# Patient Record
Sex: MALE | Race: BLACK OR AFRICAN AMERICAN | URBAN - METROPOLITAN AREA
[De-identification: names, ages, dates, MRNs, and addresses within clinical notes are randomized per-mention and may not be internally consistent; named-entity substitution may affect disease eponyms.]

---

## 2019-04-04 ENCOUNTER — OFFICE VISIT (OUTPATIENT)
Dept: FAMILY MEDICINE CLINIC | Age: 44
End: 2019-04-04

## 2019-04-04 VITALS — BODY MASS INDEX: 31.54 KG/M2 | HEIGHT: 73 IN | WEIGHT: 238 LBS | RESPIRATION RATE: 17 BRPM

## 2019-04-04 DIAGNOSIS — M54.50 ACUTE BILATERAL LOW BACK PAIN WITHOUT SCIATICA: Primary | ICD-10-CM

## 2019-04-04 RX ORDER — PREDNISONE 20 MG/1
60 TABLET ORAL
Qty: 15 TAB | Refills: 0 | Status: SHIPPED | OUTPATIENT
Start: 2019-04-04 | End: 2019-04-09

## 2019-04-04 RX ORDER — CYCLOBENZAPRINE HCL 10 MG
10 TABLET ORAL
Qty: 15 TAB | Refills: 0 | Status: SHIPPED | OUTPATIENT
Start: 2019-04-04

## 2019-04-04 NOTE — PROGRESS NOTES
Subjective:     Mary Pang is a 44 y.o. male who presents for initial evaluation of low back problems. Symptoms have been present for 1 day and include ache and discomfort in bilateral lower back, bilateral shoulders, and bilateral knees, denies sharp, stabbing, pain (aching in character; 3/10 in severity), denies weakness, denies numbness, denies paresthesias, denies stiffness. Initial inciting event: patient reports he was helping to lower a patient at work on 4/3/19 when the patient became weak and he was assisting her to the ground, patient reports he felt like his knees may have popped during the lowering; however, the discomfort did not really begin until a short time after the incident occurred. Symptoms are worst: intermittent. Alleviating factors identifiable by patient are none. Exacerbating factors identifiable by patient are none. Treatments so far initiated by patient: none Previous lower back problems: patient reports he has had similar pain from his previous job in [employer] of 20 years. . Previous workup: none. Previous treatments: none. Pt reports soreness in his lower back, shoulders, knees. Patient reports 3/10 scale pain. Denies any loss of bowel or bladder. denies any numbness or tingling. Has not taken any OTC medications for the pain at this time. Patient is working today from 830am til 5pm.  Patient did not seek medical attention yesterday when this injury occurred. Patient reports he is scheduled to leave for [place] on an assignment on May 2,2019. Patient reports he recently retired from [employer] and has had back, shoulder, knee dificulties throughout his career and feels this may have caused some increased discomfort to his already chronic condition. Patient denies having had manipulations, surgeries, or injections for these injuries. Patient further reports most of this started while playing football in school.   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 Patient will follow up with this clinic on 4/8/19 at 10:00am.  Spoke at length with the patient about the need to stay active and stay hydrated. We discussed that based on his physical examination this is most likely an exacerbation of his chronic shoulder, back, knee conditions which he voluntarily reported during our discussion. We spoke at length about using proper body mechanics, slide boards, and other adjuncts to move patients while at work. Current Functional Limitations:  1. How long can you sit? 30 min  2. How long can you stand? 30 min  3. How long can you walk? 30 min  4. How much weight can you lift? 50 lbs  5. How long have your activities been limited to this degree? Patient denies any limitations since the injury occurred    'Red Flags' for Fracture:  1. Recent history of major trauma: no  2. Minor trauma or strenuous lifting in older or potentially   osteoporotic patient: no  3. History of chronic corticosteroid therapy: no    Red Flags' for Neoplasm or Infection:  1. Age over 48 or under 21: no  2. History of cancer, danny. breast, lung, prostate: no  3. Recent fever/chills: no  4. Recent unexplained weight loss: no  5. Recent bacterial infection: no  6. IV drug use: no  7. Immunosuppression (from meds, HIV, etc.): no  8. Pain worse when supine or at night: no    Red Flags' Cauda Equina Syndrome:  1. Complaint of saddle anesthesia: no  2. Recent onset of bladder dysfunction, danny. retention:   no  3. Severe or progressive LE neurologic deficit: no    There is no problem list on file for this patient. There are no active problems to display for this patient. Current Outpatient Medications   Medication Sig Dispense Refill    predniSONE (DELTASONE) 20 mg tablet Take 60 mg by mouth daily (with breakfast) for 5 days. 15 Tab 0    cyclobenzaprine (FLEXERIL) 10 mg tablet Take 1 Tab by mouth nightly.  15 Tab 0     No Known Allergies  History reviewed. No pertinent past medical history. Past Surgical History:   Procedure Laterality Date    HX ORTHOPAEDIC Right     knee scope    HX ROTATOR CUFF REPAIR       History reviewed. No pertinent family history. Social History     Tobacco Use    Smoking status: Current Some Day Smoker    Smokeless tobacco: Never Used   Substance Use Topics    Alcohol use: Yes        Review of Systems  A comprehensive review of systems was negative except for: Musculoskeletal: positive for back pain. There is no pinpoint pain located. Patient only reports as an ache. No muscle spasm noted with palpation of the posterior torso. Objective:     Visit Vitals  Resp 17   Ht 6' 1\" (1.854 m)   Wt 238 lb (108 kg)   BMI 31.40 kg/m²     General: alert, cooperative, no distress, appears stated age   Body habitus: not obese   Gait: normal   Visible deformity? no   Leg muscle asymmetry? no   Tender spinous processes? no   Tender back or buttock? no       Left Right   Pain with piriformis stretch no no   Strength Testing:     Quadriceps (L4) 5/5 5/5    Hamstrings (L5 and S1) 5/5 5/5    Ankle dorsiflexion (L4 and L5) 5/5 5/5    Ankle plantarflexion (S1) 5/5 5/5    Great toe dorsiflexion (L4 and L5) 5/5 5/5    Toe flexors (S1) 5/5 5/5   Reflexes: Ankle jerk (S1): 3 3    Knee jerk (L4): 3 3   Sensory Exam (Light Touch):     Medial foot (L4): yes yes    Mid-dorsal foot (L5): yes yes    Lateral foot (S1): yes yes   Straight Leg Raise Testing:      Degrees raised:  Normal Normal    Symptoms Evoked? no no       Assessment/Plan:     nonspecific acute low back pain    1. Patient Education ([id number] indicates topic was discussed): The vast majority of patients with acute low back problems, including those with symptoms in the lower extremities, spontaneously recover activity tolerance within one month.  yes    In the absence of signs of dangerous conditions, there is no need for special imaging studies, which will show significant abnormalities in around 30% of normal people anyway. yes    Proper lifting technique will help speed recovery and avoid recurrences (hold objects close to body while lifting them; avoid twisting, bending, or reaching while lifting). yes    Aerobic conditioning with walking, stationary biking, swimming, or light jogging may help prevent debilitation from activity and can usually be started within the first two weeks of sx. May increase symptoms slightly at first. yes    2. Reduction in activity (recc'd max 2-4d for pts severely limited by sx): no    3. Exercise:  Aerobic: as tolerated  Trunk strengthening (recc'd only after 2 weeks or more): as tolerated  Advised to contact us is prescribed exercise seems to exacerbate sx: not applicable    4. Manipulation (most effective in first month of sx):  no    5. Medications:   Acetaminophen: yes - PRN  NSAIDs: no  Muscle relaxants yes - prescribed for use at bedtime  Opioid analgesics: no    6. Further Workup    Plain x-rays of lumbosacral spine (this or CT recc'd if red flags for spinal fracture or neoplasm): no and not applicable    CT or MRI of lumbosacral spine (recc'd if red flags for spinal fracture and > 10d or multiple sites of pain; or if cauda equina, tumor, or infection strongly suspected): not applicable    CBC, ESR, and urinalysis (recc'd if red flags for neoplasm/infection): not applicable    Immediate orthopedic or neurosurgical consultation (recc'd if red flags for cauda equina syndrome): not applicable      [id number] ICD-9-CM    1. Acute bilateral low back pain without sciatica M54.5 724.2 predniSONE (DELTASONE) 20 mg tablet     338.19 cyclobenzaprine (FLEXERIL) 10 mg tablet   .

## 2019-04-04 NOTE — PROGRESS NOTES
Chief Complaint   Patient presents with    Work Related Injury    Back Pain    Knee Pain    Hip Pain     Pt is being seen for work related injury that occurred on 4/3/19. Pt c/o lower back, bilateral knee and bilateral hip pain.

## 2019-04-04 NOTE — PATIENT INSTRUCTIONS
Learning About How to Have a Healthy Back  What causes back pain? Back pain is often caused by overuse, strain, or injury. For example, people often hurt their backs playing sports or working in the yard, being jolted in a car accident, or lifting something too heavy. Aging plays a part too. Your bones and muscles tend to lose strength as you age, which makes injury more likely. The spongy discs between the bones of the spine (vertebrae) may suffer from wear and tear and no longer provide enough cushion between the bones. A disc that bulges or breaks open (herniated disc) can press on nerves, causing back pain. In some people, back pain is the result of arthritis, broken vertebrae caused by bone loss (osteoporosis), illness, or a spine problem. Although most people have back pain at one time or another, there are steps you can take to make it less likely. How can you have a healthy back? Reduce stress on your back through good posture  Slumping or slouching alone may not cause low back pain. But after the back has been strained or injured, bad posture can make pain worse. · Sleep in a position that maintains your back's normal curves and on a mattress that feels comfortable. Sleep on your side with a pillow between your knees, or sleep on your back with a pillow under your knees. These positions can reduce strain on your back. · Stand and sit up straight. \"Good posture\" generally means your ears, shoulders, and hips are in a straight line. · If you must stand for a long time, put one foot on a stool, ledge, or box. Switch feet every now and then. · Sit in a chair that is low enough to let you place both feet flat on the floor with both knees nearly level with your hips. If your chair or desk is too high, use a footrest to raise your knees. Place a small pillow, a rolled-up towel, or a lumbar roll in the curve of your back if you need extra support.   · Try a kneeling chair, which helps tilt your hips forward. This takes pressure off your lower back. · Try sitting on an exercise ball. It can rock from side to side, which helps keep your back loose. · When driving, keep your knees nearly level with your hips. Sit straight, and drive with both hands on the steering wheel. Your arms should be in a slightly bent position. Reduce stress on your back through careful lifting  · Squat down, bending at the hips and knees only. If you need to, put one knee to the floor and extend your other knee in front of you, bent at a right angle (half kneeling). · Press your chest straight forward. This helps keep your upper back straight while keeping a slight arch in your low back. · Hold the load as close to your body as possible, at the level of your belly button (navel). · Use your feet to change direction, taking small steps. · Lead with your hips as you change direction. Keep your shoulders in line with your hips as you move. · Set down your load carefully, squatting with your knees and hips only. Exercise and stretch your back  · Do some exercise on most days of the week, if your doctor says it is okay. You can walk, run, swim, or cycle. · Stretch your back muscles. Here are a few exercises to try:  ? Lie on your back, and gently pull one bent knee to your chest. Put that foot back on the floor, and then pull the other knee to your chest.  ? Do pelvic tilts. Lie on your back with your knees bent. Tighten your stomach muscles. Pull your belly button (navel) in and up toward your ribs. You should feel like your back is pressing to the floor and your hips and pelvis are slightly lifting off the floor. Hold for 6 seconds while breathing smoothly. ? Sit with your back flat against a wall. · Keep your core muscles strong. The muscles of your back, belly (abdomen), and buttocks support your spine. ? Pull in your belly and imagine pulling your navel toward your spine. Hold this for 6 seconds, then relax.  Remember to keep breathing normally as you tense your muscles. ? Do curl-ups. Always do them with your knees bent. Keep your low back on the floor, and curl your shoulders toward your knees using a smooth, slow motion. Keep your arms folded across your chest. If this bothers your neck, try putting your hands behind your neck (not your head), with your elbows spread apart. ? Lie on your back with your knees bent and your feet flat on the floor. Tighten your belly muscles, and then push with your feet and raise your buttocks up a few inches. Hold this position 6 seconds as you continue to breathe normally, then lower yourself slowly to the floor. Repeat 8 to 12 times. ? If you like group exercise, try Pilates or yoga. These classes have poses that strengthen the core muscles. Lead a healthy lifestyle  · Stay at a healthy weight to avoid strain on your back. · Do not smoke. Smoking increases the risk of osteoporosis, which weakens the spine. If you need help quitting, talk to your doctor about stop-smoking programs and medicines. These can increase your chances of quitting for good. Where can you learn more? Go to http://sanya-corwin.info/. Enter L315 in the search box to learn more about \"Learning About How to Have a Healthy Back. \"  Current as of: September 20, 2018  Content Version: 11.9  © 0071-7348 [employer]. Care instructions adapted under license by [employer] (which disclaims liability or warranty for this information). If you have questions about a medical condition or this instruction, always ask your healthcare professional. [place] [address] any warranty or liability for your use of this information.

## 2019-04-08 ENCOUNTER — OFFICE VISIT (OUTPATIENT)
Dept: FAMILY MEDICINE CLINIC | Age: 44
End: 2019-04-08

## 2019-04-08 VITALS
SYSTOLIC BLOOD PRESSURE: 122 MMHG | BODY MASS INDEX: 30.75 KG/M2 | HEART RATE: 90 BPM | WEIGHT: 232 LBS | TEMPERATURE: 99.4 F | DIASTOLIC BLOOD PRESSURE: 85 MMHG | OXYGEN SATURATION: 98 % | RESPIRATION RATE: 18 BRPM | HEIGHT: 73 IN

## 2019-04-08 DIAGNOSIS — Z09 FOLLOW-UP EXAMINATION FOR INJURY: Primary | ICD-10-CM

## 2019-04-08 NOTE — PROGRESS NOTES
HISTORY OF PRESENT ILLNESS  Lucy Holloway is a 44 y.o. male. 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 He has been taking steroids and flexeril x 5 days. He states he has \"no pain today\". Visit Vitals  /85   Pulse 90   Temp 99.4 °F (37.4 °C) (Oral)   Resp 18   Ht 6' 1\" (1.854 m)   Wt 232 lb (105.2 kg)   SpO2 98%   BMI 30.61 kg/m²       HPI    Review of Systems   Musculoskeletal: Negative for back pain and joint pain. Physical Exam   Constitutional: He is oriented to person, place, and time. He appears well-developed and well-nourished. Musculoskeletal:        Lumbar back: He exhibits normal range of motion, no tenderness, no bony tenderness, no swelling and no pain. Neurological: He is alert and oriented to person, place, and time. Skin: Skin is warm and dry. Psychiatric: He has a normal mood and affect. ASSESSMENT and PLAN    ICD-10-CM ICD-9-CM    1. Follow-up examination for injury Z09 V67.59      No orders of the defined types were placed in this encounter.   return to work full-duty  Follow-up if symptoms return  Practice good body mechanics when moving patients, ask for assistance  Worker's comp form completed